# Patient Record
Sex: FEMALE | Race: WHITE
[De-identification: names, ages, dates, MRNs, and addresses within clinical notes are randomized per-mention and may not be internally consistent; named-entity substitution may affect disease eponyms.]

---

## 2019-03-19 ENCOUNTER — HOSPITAL ENCOUNTER (EMERGENCY)
Dept: HOSPITAL 59 - ER | Age: 24
LOS: 1 days | Discharge: HOME | End: 2019-03-20
Payer: MEDICAID

## 2019-03-19 DIAGNOSIS — L03.317: Primary | ICD-10-CM

## 2019-03-19 PROCEDURE — 99284 EMERGENCY DEPT VISIT MOD MDM: CPT

## 2019-03-19 PROCEDURE — 99283 EMERGENCY DEPT VISIT LOW MDM: CPT

## 2019-03-19 PROCEDURE — 72192 CT PELVIS W/O DYE: CPT

## 2019-03-19 NOTE — EMERGENCY DEPARTMENT RECORD
History of Present Illness





- General


Chief complaint: Abscess


Stated complaint: abcess on tailbone


Time Seen by Provider: 19 22:46


Source: Patient


Mode of Arrival: Ambulatory


Limitations: No limitations





- History of Present Illness


Initial comments: 





22 yo female presents to ED for evaluation of "pain to my tailbone area for 2 

days".  Patient reports a history of previous abscess to the same area about 6-

7 years ago, denies swelling, fevers, drainage, or recent illness symptoms.  

Patient denies recent injury, and denies health problems at her baseline.


MD complaint: Abscess/boil


Onset/Timin


-: Days(s)


Location: Buttocks


Severity: Moderate


Quality: Aching


Consistency: Constant


Improves with: None


Worsens with: None


Context: None


Associated symptoms: Other (Pain)


Treatments Prior to Arrival: None





- Related Data


 Previous Rx's











 Medication  Instructions  Recorded


 


Clindamycin HCl 300 mg PO QID #27 capsule 19











 Allergies











Allergy/AdvReac Type Severity Reaction Status Date / Time


 


No Known Drug Allergies Allergy   Verified 16 23:57














Review of Systems


Constitutional: Denies: Chills, Fever, Malaise, Night sweats


Eyes: Denies: Eye discharge, Eye pain


ENT: Denies: Congestion, Ear pain, Epistaxis


Respiratory: Denies: Cough, Dyspnea


Cardiovascular: Denies: Chest pain, Dyspnea on exertion


Endocrine: Denies: Fatigue, Heat or cold intolerance


Gastrointestinal: Denies: Abdominal pain, Nausea, Vomiting


Genitourinary: Denies: Incontinence, Retention


Musculoskeletal: Denies: Arthralgia, Back pain, Gout, Joint swelling


Skin: Denies: Bruising, Change in color


Neurological: Denies: Abnormal gait, Confusion, Headache, Seizure


Psychiatric: Denies: Anxiety


Hematological/Lymphatic: Denies: Anemia, Blood Clots





Past Medical History





- SOCIAL HISTORY


Smoking Status: Never smoker





- RESPIRATORY


Hx Respiratory Disorders: No





- CARDIOVASCULAR


Hx Cardio Disorders: No





- NEURO


Hx Neuro Disorders: No





- GI


Hx GI Disorders: No





- 


Hx Genitourinary Disorders: No





- ENDOCRINE


Hx Endocrine Disorders: No





- MUSCULOSKELETAL


Hx Musculoskeletal Disorders: No





- PSYCH


Hx Psych Problems: Yes


Hx Behavior Problems: Yes


Hx Depression: Yes


Comment:: ADD





- HEMATOLOGY/ONCOLOGY


Hx Hematology/Oncology Disorders: No





Physical Exam





- General


General Appearance: Alert, Oriented x3, Cooperative, Anxious


Limitations: No limitations





- Head


Head exam: Atraumatic, Normocephalic, Normal inspection


Head exam detail: negative: Abrasion, Contusion, Ferro's sign, General 

tenderness, Hematoma, Laceration





- Eye


Eye exam: Normal appearance.  negative: Conjunctival injection, Periorbital 

swelling, Periorbital tenderness, Scleral icterus





- ENT


Ear exam: negative: Auricular hematoma, Auricular trauma


Nasal Exam: negative: Active bleeding, Discharge, Dried blood, Foreign body


Mouth exam: negative: Drooling, Laceration, Muffled voice, Tongue elevation





- Neck


Neck exam: Normal inspection.  negative: Meningismus, Tenderness





- Respiratory


Respiratory exam: Normal lung sounds bilaterally.  negative: Rales, Respiratory 

distress, Rhonchi, Stridor





- Cardiovascular


Cardiovascular Exam: Regular rate, Normal rhythm, Normal heart sounds





- GI/Abdominal


GI/Abdominal exam: Soft.  negative: Rebound, Rigid, Tenderness





- Rectal


Rectal exam: Deferred





- 


 exam: Deferred





- Extremities


Extremities exam: Normal inspection.  negative: Pedal edema, Tenderness





- Back


Back exam: Reports: Tenderness, Other (TTP over the tailbone region, no 

erythema noted, no induration, no fluctuance present.).  Denies: CVA tenderness 

(R), CVA tenderness (L)





- Neurological


Neurological exam: Alert, Normal gait, Oriented X3





- Psychiatric


Psychiatric exam: Normal affect, Normal mood





- Skin


Skin exam: Normal color.  negative: Abrasion


Type of lesion: negative: abrasion





Course





 Vital Signs











  19





  22:39


 


Temperature 98.1 F


 


Pulse Rate [ 101 H





Pulse Ox Probe] 


 


Respiratory 20





Rate 


 


Blood Pressure 132/92





[Left Arm] 


 


Pulse Ox 97














- Reevaluation(s)


Reevaluation #1: 





19 00:02


CT Pelvis:


Cellulitic changes with probable 1.0 cm abscess identified in the midline 

posteriorly





Patient was updated on her CT imaging results


Abscess cannot be identified on examination


Will initiate treatment with Clindamycin, refer to Dr. Collins for further 

evaluation


Patient agrees with the plan of care as directed.





Disposition


Disposition: Discharge


Clinical Impression: 


 Cellulitis and abscess of buttock





Disposition: Home, Self-Care


Condition: (2) Stable


Instructions:  Cellulitis (ED)


Additional Instructions: 


Return to ED if your symptoms worsen or if you have any concerns.


Clindamycin as directed.


Follow-up with Dr. Collins in 3-5 days as directed.


Prescriptions: 


Clindamycin HCl 300 mg PO QID #27 capsule


Referrals: 


Patric Collins [DOCTOR OF OSTEOPATH] - 


[facility] [Provider Group]


Forms:  Patient Portal Access


Time of Disposition: 00:07





Quality





- Quality Measures


Quality Measures: N/A





- Blood Pressure Screening


Does Patient Have Any of the Following: No


Blood Pressure Classification: Normal BP Reading


Systolic Measurement: 103


Diastolic Measurement: 57


Screening for High Blood Pressure: < Normal BP, F/U Not Required > []


First Hypertensive Follow-up Interventions: Referral to alternative/primary 

care provider.

## 2019-03-20 RX ADMIN — CLINDAMYCIN HYDROCHLORIDE ONE MG: 150 CAPSULE ORAL at 00:18

## 2019-03-24 ENCOUNTER — HOSPITAL ENCOUNTER (EMERGENCY)
Dept: HOSPITAL 59 - ER | Age: 24
Discharge: HOME | End: 2019-03-24
Payer: MEDICAID

## 2019-03-24 DIAGNOSIS — L02.31: Primary | ICD-10-CM

## 2019-03-24 PROCEDURE — 96374 THER/PROPH/DIAG INJ IV PUSH: CPT

## 2019-03-24 PROCEDURE — 10060 I&D ABSCESS SIMPLE/SINGLE: CPT

## 2019-03-24 PROCEDURE — 99284 EMERGENCY DEPT VISIT MOD MDM: CPT

## 2019-03-24 NOTE — EMERGENCY DEPARTMENT RECORD
History of Present Illness





- General


Chief complaint: Abscess


Stated complaint: CYST TAILBONE


Time Seen by Provider: 19 19:21


Source: Patient


Mode of Arrival: Ambulatory


Limitations: No limitations





- History of Present Illness


Initial comments: 





22 yo female presents with an abscess in her glutteal fold.  She was seen in 

the ED on 3/19.  CT demonstrated 1cm abscess.  The area was not initially 

drained.  She has an appointment with Dr Collins in one week.  


MD complaint: Abscess/boil


Onset/Timin


-: Week(s)


Hx Tetanus Toxoid Vaccination: Yes


Severity: Moderate


Severity scale (1-10): 6


Quality: Sharp


Consistency: Getting worse


Improves with: None


Worsens with: Other


Treatments Prior to Arrival: Antibiotic





- Related Data


 Home Medications











 Medication  Instructions  Recorded  Confirmed  Last Taken


 


Acetaminophen with Codeine 1 each PO DAILY 19





[Tylenol with Codeine #3 Tablet]    


 


Medroxyprogesterone Acetate 1 tab PO DAILY 19 Unknown








 Previous Rx's











 Medication  Instructions  Recorded


 


Clindamycin HCl 300 mg PO QID #27 capsule 19











 Allergies











Allergy/AdvReac Type Severity Reaction Status Date / Time


 


No Known Drug Allergies Allergy   Verified 16 23:57














Travel Screening





- Travel/Exposure Within Last 30 Days


Have you traveled within the last 30 days?: No





Review of Systems


Constitutional: Denies: Chills, Fever, Malaise, Weakness


Eyes: Denies: Eye discharge


ENT: Denies: Congestion


Respiratory: Denies: Cough


Cardiovascular: Denies: Chest pain, Syncope


Endocrine: Denies: Fatigue


Gastrointestinal: Denies: Abdominal pain, Diarrhea, Nausea, Vomiting


Genitourinary: Denies: Dysuria


Musculoskeletal: Denies: Arthralgia, Back pain, Myalgia


Skin: Denies: Bruising, Change in color, Rash


Neurological: Denies: Headache


Psychiatric: Denies: Anxiety


Hematological/Lymphatic: Denies: Easy bleeding, Easy bruising





Past Medical History





- SOCIAL HISTORY


Smoking Status: Never smoker


Alcohol Use: Occasional


Drug Use: None





- RESPIRATORY


Hx Respiratory Disorders: No





- CARDIOVASCULAR


Hx Cardio Disorders: No





- NEURO


Hx Neuro Disorders: No





- GI


Hx GI Disorders: No





- 


Hx Genitourinary Disorders: No





- ENDOCRINE


Hx Endocrine Disorders: No





- MUSCULOSKELETAL


Hx Musculoskeletal Disorders: No





- PSYCH


Hx Psych Problems: Yes


Hx Behavior Problems: Yes


Hx Depression: Yes


Comment:: ADD





- HEMATOLOGY/ONCOLOGY


Hx Hematology/Oncology Disorders: No





Family Medical History


Any Significant Family History?: Yes


Hx Heart Disease: Grandparents


Hx HTN: Mother, Grandparents





Physical Exam





- General


General Appearance: Alert, No acute distress


Limitations: No limitations





- Head


Head exam: Atraumatic





- Eye


Eye exam: Normal appearance





- ENT


ENT exam: Normal exam


Ear exam: Normal external inspection


Nasal Exam: Normal inspection


Mouth exam: Normal external inspection





- Neck


Neck exam: Normal inspection





- Cardiovascular


Cardiovascular Exam: Regular rate, Normal rhythm, Normal heart sounds





- Rectal


Rectal exam: Normal inspection, Other (No anus involvement).  negative: 

Tenderness





- Back


Back exam: Reports: Other.  Denies: Normal inspection


Image of Body Front/Back: 


  __________________________














  __________________________





 1 - 2cm upper glutteal abscess, not near anus, to the right of midline








- Neurological


Neurological exam: Alert, Oriented X3





- Psychiatric


Psychiatric exam: Normal affect, Normal mood





- Skin


Skin exam: Erythema (abscess)





Course





 Vital Signs











  19





  19:08


 


Temperature 99.4 F


 


Pulse Rate [ 113 H





Left] 


 


Respiratory 16





Rate 


 


Blood Pressure 123/80





[Left Arm] 


 


Pulse Ox 97














- Reevaluation(s)


Reevaluation #1: 





19 19:34


I discussed the risk and benefit of I and D


At this point the area appears ready to drain


19 20:11





Procedure:


Incision and Drainage of Abscess


Chloraprep


Lidocaine with Epinephrine 4 ml


11 Blade used to make an 15 mm opening


Pus was immediately expressed


The cavity was irrigated and loculations broken up


Packing was placed in the cavity


The patient tolerated the procedure well


We discussed home care and when to return for a recheck


She is to pull the drain in 3 days if it remains in place that long


If it comes out then do sitz baths, and irrigate the area








Disposition


Disposition: Discharge


Clinical Impression: 


 Abscess, gluteal cleft





Disposition: Home, Self-Care


Condition: (1) Good


Instructions:  Abscess Incision and Drainage (ED)


Additional Instructions: 


Follow up as scheduled with Dr Collins.  Call the [facility] for follow up 

this week


Return to the ER for a recheck if worse, any new concerns or questions


Continue the prescriptions provided as directed


Review this ER visit and the tests performed with your family doctor


Referrals: 


Patric Collins [DOCTOR OF OSTEOPATH] - 


Forms:  Patient Portal Access


Time of Disposition: 20:14





Quality





- Quality Measures


Quality Measures: N/A





- Blood Pressure Screening


Does Patient Have Any of the Following: No


Blood Pressure Classification: Normal BP Reading


Systolic Measurement: 115


Diastolic Measurement: 63


Screening for High Blood Pressure: < Normal BP, F/U Not Required > []